# Patient Record
Sex: MALE | Race: OTHER | HISPANIC OR LATINO | Employment: UNEMPLOYED | RURAL
[De-identification: names, ages, dates, MRNs, and addresses within clinical notes are randomized per-mention and may not be internally consistent; named-entity substitution may affect disease eponyms.]

---

## 2020-08-13 ENCOUNTER — HISTORICAL (OUTPATIENT)
Dept: ADMINISTRATIVE | Facility: HOSPITAL | Age: 55
End: 2020-08-13

## 2020-08-13 LAB
ALBUMIN SERPL BCP-MCNC: 2.8 G/DL (ref 3.5–5)
ALBUMIN/GLOB SERPL: 0.6 {RATIO}
ALP SERPL-CCNC: 68 U/L (ref 45–115)
ALT SERPL W P-5'-P-CCNC: 26 U/L (ref 16–61)
ANION GAP SERPL CALCULATED.3IONS-SCNC: 16 MMOL/L (ref 7–16)
AST SERPL W P-5'-P-CCNC: 35 U/L (ref 15–37)
BASOPHILS # BLD AUTO: 0 X10E3/UL (ref 0–0.2)
BASOPHILS NFR BLD AUTO: 0 % (ref 0–1)
BILIRUB SERPL-MCNC: 0.5 MG/DL (ref 0–1.2)
BUN SERPL-MCNC: 17 MG/DL (ref 7–18)
CALCIUM SERPL-MCNC: 8.4 MG/DL (ref 8.5–10.1)
CHLORIDE SERPL-SCNC: 97 MMOL/L (ref 98–107)
CO2 SERPL-SCNC: 24 MMOL/L (ref 21–32)
CREAT SERPL-MCNC: 1.03 MG/DL (ref 0.7–1.3)
EOSINOPHIL # BLD AUTO: 0 X10E3/UL (ref 0–0.5)
EOSINOPHIL NFR BLD AUTO: 0 % (ref 1–4)
ERYTHROCYTE [DISTWIDTH] IN BLOOD BY AUTOMATED COUNT: 13.1 % (ref 11.5–14.5)
GLOBULIN SER-MCNC: 4.8 G/DL (ref 2–4)
GLUCOSE SERPL-MCNC: 371 MG/DL (ref 74–106)
HCT VFR BLD AUTO: 42.1 % (ref 40–54)
HGB BLD-MCNC: 14.2 G/DL (ref 13.5–18)
LYMPHOCYTES # BLD AUTO: 0.71 X10E3/UL (ref 1–4.8)
LYMPHOCYTES NFR BLD AUTO: 19.3 % (ref 27–41)
MCH RBC QN AUTO: 27.7 PG (ref 27–31)
MCHC RBC AUTO-ENTMCNC: 33.7 G/DL (ref 32–36)
MCV RBC AUTO: 82 FL (ref 80–96)
MONOCYTES # BLD AUTO: 0.24 X10E3/UL (ref 0–0.8)
MONOCYTES NFR BLD AUTO: 6.5 % (ref 2–6)
MPC BLD CALC-MCNC: 10.8 FL (ref 9.4–12.4)
NEUTROPHILS # BLD AUTO: 2.73 X10E3/UL (ref 1.8–7.7)
NEUTROPHILS NFR BLD AUTO: 74.2 % (ref 53–65)
PLATELET # BLD AUTO: 169 X10E3/UL (ref 150–450)
POTASSIUM SERPL-SCNC: 3.9 MMOL/L (ref 3.5–5.1)
PROT SERPL-MCNC: 7.6 G/DL (ref 6.4–8.2)
RBC # BLD AUTO: 5.12 X10E6/UL (ref 4.6–6.2)
SARS-COV+SARS-COV-2 AG RESP QL IA.RAPID: POSITIVE
SODIUM SERPL-SCNC: 133 MMOL/L (ref 136–145)
TROPONIN I SERPL-MCNC: <0.017 NG/ML (ref 0–0.06)
WBC # BLD AUTO: 3.68 X10E3/UL (ref 4.5–11)

## 2023-06-28 ENCOUNTER — HOSPITAL ENCOUNTER (EMERGENCY)
Facility: HOSPITAL | Age: 58
Discharge: HOME | End: 2023-06-28

## 2023-06-28 VITALS
HEART RATE: 72 BPM | TEMPERATURE: 98 F | SYSTOLIC BLOOD PRESSURE: 124 MMHG | OXYGEN SATURATION: 100 % | RESPIRATION RATE: 16 BRPM | BODY MASS INDEX: 21.59 KG/M2 | WEIGHT: 159.38 LBS | DIASTOLIC BLOOD PRESSURE: 72 MMHG | HEIGHT: 72 IN

## 2023-06-28 DIAGNOSIS — R30.0 DYSURIA: ICD-10-CM

## 2023-06-28 DIAGNOSIS — N40.0 PROSTATE ENLARGEMENT: ICD-10-CM

## 2023-06-28 DIAGNOSIS — K59.00 CONSTIPATION, UNSPECIFIED CONSTIPATION TYPE: ICD-10-CM

## 2023-06-28 DIAGNOSIS — R10.9 ABDOMINAL PAIN, UNSPECIFIED ABDOMINAL LOCATION: Primary | ICD-10-CM

## 2023-06-28 LAB
ALBUMIN SERPL BCP-MCNC: 3.4 G/DL (ref 3.5–5)
ALBUMIN/GLOB SERPL: 0.9 {RATIO}
ALP SERPL-CCNC: 98 U/L (ref 45–115)
ALT SERPL W P-5'-P-CCNC: 16 U/L (ref 16–61)
AMYLASE SERPL-CCNC: 38 U/L (ref 25–115)
ANION GAP SERPL CALCULATED.3IONS-SCNC: 13 MMOL/L (ref 7–16)
AST SERPL W P-5'-P-CCNC: 13 U/L (ref 15–37)
BASOPHILS NFR BLD AUTO: 0.3 % (ref 0–1)
BILIRUB SERPL-MCNC: 0.5 MG/DL (ref ?–1.2)
BUN SERPL-MCNC: 16 MG/DL (ref 7–18)
BUN/CREAT SERPL: 15 (ref 6–20)
CALCIUM SERPL-MCNC: 8.7 MG/DL (ref 8.5–10.1)
CHLORIDE SERPL-SCNC: 97 MMOL/L (ref 98–107)
CO2 SERPL-SCNC: 27 MMOL/L (ref 21–32)
CREAT SERPL-MCNC: 1.08 MG/DL (ref 0.7–1.3)
EGFR (NO RACE VARIABLE) (RUSH/TITUS): 81 ML/MIN/1.73M2
EOSINOPHIL NFR BLD AUTO: 0.8 % (ref 1–4)
ERYTHROCYTE [DISTWIDTH] IN BLOOD BY AUTOMATED COUNT: 12.4 % (ref 11.5–14.5)
GLOBULIN SER-MCNC: 3.9 G/DL (ref 2–4)
GLUCOSE SERPL-MCNC: 490 MG/DL (ref 74–106)
HCT VFR BLD AUTO: 38.3 % (ref 40–54)
HGB BLD-MCNC: 13.2 G/DL (ref 13.5–18)
IMM GRANULOCYTES NFR BLD: 0.3 % (ref 0–0.4)
LIPASE SERPL-CCNC: 56 U/L (ref 73–393)
LYMPHOCYTES NFR BLD AUTO: 32.1 % (ref 27–41)
MCH RBC QN AUTO: 29.3 PG (ref 27–31)
MCHC RBC AUTO-ENTMCNC: 34.5 G/DL (ref 32–36)
MCV RBC AUTO: 84.9 FL (ref 80–96)
MONOCYTES NFR BLD AUTO: 8 % (ref 2–6)
MPC BLD CALC-MCNC: 9.7 FL (ref 9.4–12.4)
NEUTROPHILS NFR BLD AUTO: 58.8 % (ref 53–65)
PLATELET # BLD AUTO: 294 K/UL (ref 150–400)
POTASSIUM SERPL-SCNC: 4.6 MMOL/L (ref 3.5–5.1)
PROT SERPL-MCNC: 7.3 G/DL (ref 6.4–8.2)
RBC # BLD AUTO: 4.51 M/UL (ref 4.6–6.2)
SODIUM SERPL-SCNC: 132 MMOL/L (ref 136–145)
WBC # BLD AUTO: 7.23 K/UL (ref 4.5–11)

## 2023-06-28 PROCEDURE — 96360 HYDRATION IV INFUSION INIT: CPT

## 2023-06-28 PROCEDURE — 99284 EMERGENCY DEPT VISIT MOD MDM: CPT | Mod: ,,,

## 2023-06-28 PROCEDURE — 83690 ASSAY OF LIPASE: CPT

## 2023-06-28 PROCEDURE — 85025 COMPLETE CBC W/AUTO DIFF WBC: CPT

## 2023-06-28 PROCEDURE — 82150 ASSAY OF AMYLASE: CPT

## 2023-06-28 PROCEDURE — 25500020 PHARM REV CODE 255

## 2023-06-28 PROCEDURE — 99284 PR EMERGENCY DEPT VISIT,LEVEL IV: ICD-10-PCS | Mod: ,,,

## 2023-06-28 PROCEDURE — 25000003 PHARM REV CODE 250

## 2023-06-28 PROCEDURE — 99285 EMERGENCY DEPT VISIT HI MDM: CPT | Mod: 25

## 2023-06-28 PROCEDURE — 84153 ASSAY OF PSA TOTAL: CPT

## 2023-06-28 PROCEDURE — 80053 COMPREHEN METABOLIC PANEL: CPT

## 2023-06-28 RX ORDER — SYRING-NEEDL,DISP,INSUL,0.3 ML 29 G X1/2"
296 SYRINGE, EMPTY DISPOSABLE MISCELLANEOUS
Status: COMPLETED | OUTPATIENT
Start: 2023-06-28 | End: 2023-06-28

## 2023-06-28 RX ORDER — TAMSULOSIN HYDROCHLORIDE 0.4 MG/1
0.4 CAPSULE ORAL DAILY
Qty: 30 CAPSULE | Refills: 0 | Status: SHIPPED | OUTPATIENT
Start: 2023-06-28 | End: 2023-07-28

## 2023-06-28 RX ORDER — CEPHALEXIN 500 MG/1
500 CAPSULE ORAL EVERY 12 HOURS
Qty: 20 CAPSULE | Refills: 0 | Status: SHIPPED | OUTPATIENT
Start: 2023-06-28 | End: 2023-07-08

## 2023-06-28 RX ADMIN — SODIUM CHLORIDE 1000 ML: 9 INJECTION, SOLUTION INTRAVENOUS at 04:06

## 2023-06-28 RX ADMIN — MAGNESIUM CITRATE 296 ML: 1.75 LIQUID ORAL at 04:06

## 2023-06-28 RX ADMIN — IOPAMIDOL 100 ML: 755 INJECTION, SOLUTION INTRAVENOUS at 03:06

## 2023-06-28 NOTE — ED PROVIDER NOTES
Encounter Date: 6/28/2023       History     Chief Complaint   Patient presents with    Abdominal Pain     3 weeks      Patient is a 57-year-old  male who presents emergency department via POV with complaint of right lower quadrant pain x3 weeks.  Patient is extremely tender to touch, denies any previous abdominal surgeries.  No rebound tenderness with palpation upon physical exam.    The history is provided by the patient.   Abdominal Pain  The current episode started several weeks ago. The onset of the illness was gradual. The problem has been gradually worsening. The abdominal pain is located in the RLQ and periumbilical region. The abdominal pain does not radiate. The severity of the abdominal pain is 10/10. The abdominal pain is relieved by nothing. The abdominal pain is exacerbated by deep breathing, movement and coughing. The other symptoms of the illness do not include fever, fatigue, jaundice, melena, nausea, vomiting, diarrhea, dysuria, hematemesis, hematochezia, vaginal discharge or vaginal bleeding.   The illness is associated with alcohol use. The patient has not had a change in bowel habit. Symptoms associated with the illness do not include chills, anorexia, diaphoresis, heartburn, constipation, urgency, hematuria, frequency or back pain. Significant associated medical issues include diabetes. Significant associated medical issues do not include PUD, GERD, inflammatory bowel disease, sickle cell disease, gallstones, liver disease, substance abuse, diverticulitis, HIV or cardiac disease.   Review of patient's allergies indicates:  No Known Allergies  Past Medical History:   Diagnosis Date    Diabetes mellitus      History reviewed. No pertinent surgical history.  History reviewed. No pertinent family history.  Social History     Tobacco Use    Smoking status: Never    Smokeless tobacco: Never   Substance Use Topics    Alcohol use: Never    Drug use: Never     Review of Systems   Constitutional:   Negative for chills, diaphoresis, fatigue and fever.   Eyes: Negative.    Respiratory: Negative.     Cardiovascular: Negative.    Gastrointestinal:  Positive for abdominal pain. Negative for abdominal distention, anal bleeding, anorexia, blood in stool, constipation, diarrhea, heartburn, hematemesis, hematochezia, jaundice, melena, nausea, rectal pain and vomiting.   Endocrine: Negative.    Genitourinary:  Negative for dysuria, frequency, hematuria, urgency, vaginal bleeding and vaginal discharge.   Musculoskeletal:  Negative for back pain.   Skin: Negative.    Allergic/Immunologic: Negative.    Neurological: Negative.    Hematological: Negative.    Psychiatric/Behavioral: Negative.       Physical Exam     Initial Vitals [06/28/23 1410]   BP Pulse Resp Temp SpO2   124/80 90 20 97.9 °F (36.6 °C) 98 %      MAP       --         Physical Exam    Nursing note and vitals reviewed.  Constitutional: Vital signs are normal. He appears well-developed and well-nourished. He is not diaphoretic. He is cooperative.  Non-toxic appearance. He does not have a sickly appearance. He does not appear ill. No distress.   Cardiovascular:  Normal rate, regular rhythm, S1 normal, S2 normal, normal heart sounds, intact distal pulses and normal pulses.     Exam reveals no gallop, no S3, no S4, no distant heart sounds and no friction rub.       No murmur heard.  No systolic murmur is present.  Pulmonary/Chest: Effort normal and breath sounds normal.   Abdominal: Abdomen is soft and flat. Bowel sounds are normal. There is abdominal tenderness in the right lower quadrant and periumbilical area. No hernia.   No right CVA tenderness.  No left CVA tenderness. There is tenderness at McBurney's point. There is no rebound, no guarding and negative Connors's sign. negative obturator sign, negative psoas sign and negative Rovsing's sign    Lymphadenopathy:     He has no cervical adenopathy.     He has no axillary adenopathy.   Neurological: He is alert  and oriented to person, place, and time. He has normal strength and normal reflexes. He displays normal reflexes. No cranial nerve deficit or sensory deficit. He displays a negative Romberg sign. GCS eye subscore is 4. GCS verbal subscore is 5. GCS motor subscore is 6.   Skin: Skin is warm, dry and intact. Capillary refill takes less than 2 seconds. No rash noted.   Psychiatric: He has a normal mood and affect. His speech is normal and behavior is normal. Thought content normal. Cognition and memory are normal.       Medical Screening Exam   See Full Note    ED Course   Procedures  Labs Reviewed   COMPREHENSIVE METABOLIC PANEL - Abnormal; Notable for the following components:       Result Value    Sodium 132 (*)     Chloride 97 (*)     Glucose 490 (*)     Albumin 3.4 (*)     AST 13 (*)     All other components within normal limits   LIPASE - Abnormal; Notable for the following components:    Lipase 56 (*)     All other components within normal limits   CBC WITH DIFFERENTIAL - Abnormal; Notable for the following components:    RBC 4.51 (*)     Hemoglobin 13.2 (*)     Hematocrit 38.3 (*)     Monocytes % 8.0 (*)     Eosinophils % 0.8 (*)     All other components within normal limits   AMYLASE - Normal   CBC W/ AUTO DIFFERENTIAL    Narrative:     The following orders were created for panel order CBC auto differential.  Procedure                               Abnormality         Status                     ---------                               -----------         ------                     CBC with Differential[[id number]]        Abnormal            Final result               Manual Differential[[id number]]                              Final result                 Please view results for these tests on the individual orders.   MANUAL DIFFERENTIAL   PSA, TOTAL (DIAGNOSTIC)          Imaging Results              CT Abdomen Pelvis With Contrast (Final result)  Result time 06/28/23 15:57:53      Final result by Tavon WHEELER  DO Odette (06/28/23 15:57:53)                   Impression:      No evidence of appendicitis. The terminal ileum is normal.  No acute findings within the right lower quadrant.    Moderate colonic stool.    Prostatomegaly, correlate with PSA    Findings suggesting bladder outlet obstruction.    Multilevel degenerative change resulting in severe spinal canal narrowing at multiple levels involving the lumbar spine.      Electronically signed by: Tavon Pino  Date:    06/28/2023  Time:    15:57               Narrative:    EXAMINATION:  CT ABDOMEN PELVIS WITH CONTRAST    CLINICAL HISTORY:  RLQ abdominal pain (Age >= 14y);    COMPARISON:  None.    TECHNIQUE:  CT ABDOMEN PELVIS WITH [id number] cc of Isovue 370    FINDINGS:  Lower lobes: Clear.    Cardiac: No effusion.    Abdomen:    Hepatobiliary/gallbladder: Normal    Spleen: Normal    Pancreas: Normal    Adrenal/Genitourinary system: Simple cyst right kidney.  No hydronephrosis.  Distended urinary bladder.  The    Bowel and Mesentery: There is no evidence for bowel obstruction.  Moderate colonic stool.  The appendix is normal.    Peritoneum: Normal.    Retroperitoneum: No enlarged lymph nodes.    Vasculature: Normal.    Reproductive: Prostatomegaly    Lymph nodes: No enlarged lymph nodes.    Abdominal wall: Normal.    Osseous structures: Multilevel degenerative change resulting in severe spinal canal narrowing at multiple levels involving the lumbar spine.                                    X-Rays:   Independently Interpreted Readings:   Other Readings:  Reading Physician Reading Date Result Priority  Tavon Pino DO  [phone] 6/28/2023 STAT    Narrative & Impression  EXAMINATION:  CT ABDOMEN PELVIS WITH CONTRAST     CLINICAL HISTORY:  RLQ abdominal pain (Age >= 14y);     COMPARISON:  None.     TECHNIQUE:  CT ABDOMEN PELVIS WITH [id number] cc of Isovue 370     FINDINGS:  Lower lobes: Clear.     Cardiac: No effusion.     Abdomen:      Hepatobiliary/gallbladder: Normal     Spleen: Normal     Pancreas: Normal     Adrenal/Genitourinary system: Simple cyst right kidney.  No hydronephrosis.  Distended urinary bladder.  The     Bowel and Mesentery: There is no evidence for bowel obstruction.  Moderate colonic stool.  The appendix is normal.     Peritoneum: Normal.     Retroperitoneum: No enlarged lymph nodes.     Vasculature: Normal.     Reproductive: Prostatomegaly     Lymph nodes: No enlarged lymph nodes.     Abdominal wall: Normal.     Osseous structures: Multilevel degenerative change resulting in severe spinal canal narrowing at multiple levels involving the lumbar spine.     Impression:     No evidence of appendicitis. The terminal ileum is normal.  No acute findings within the right lower quadrant.     Moderate colonic stool.     Prostatomegaly, correlate with PSA     Findings suggesting bladder outlet obstruction.     Multilevel degenerative change resulting in severe spinal canal narrowing at multiple levels involving the lumbar spine.        Electronically signed by: Tavon Pino  Date:                                            06/28/2023  Time:                                           15:57    Medications   sodium chloride 0.9% bolus 1,000 mL 1,000 mL (1,000 mLs Intravenous New Bag 6/28/23 1604)   magnesium citrate solution 296 mL (has no administration in time range)   iopamidoL (ISOVUE-370) injection 100 mL (100 mLs Intravenous Given 6/28/23 8623)     Medical Decision Making:   Initial Assessment:    Patient is a 57-year-old  male who presents emergency department via POV with complaint of right lower quadrant pain x3 weeks.  Patient is extremely tender to touch, denies any previous abdominal surgeries.  No rebound tenderness with palpation upon physical exam.    The history is provided by the patient.   Abdominal Pain  The current episode started several weeks ago. The onset of the illness was gradual. The problem has been  gradually worsening. The abdominal pain is located in the RLQ and periumbilical region. The abdominal pain does not radiate. The severity of the abdominal pain is 10/10. The abdominal pain is relieved by nothing. The abdominal pain is exacerbated by deep breathing, movement and coughing. The other symptoms of the illness do not include fever, fatigue, jaundice, melena, nausea, vomiting, diarrhea, dysuria, hematemesis, hematochezia, vaginal discharge or vaginal bleeding.   The illness is associated with alcohol use. The patient has not had a change in bowel habit. Symptoms associated with the illness do not include chills, anorexia, diaphoresis, heartburn, constipation, urgency, hematuria, frequency or back pain. Significant associated medical issues include diabetes. Significant associated medical issues do not include PUD, GERD, inflammatory bowel disease, sickle cell disease, gallstones, liver disease, substance abuse, diverticulitis, HIV or cardiac disease.     Physical Exam    Nursing note and vitals reviewed.  Constitutional: Vital signs are normal. He appears well-developed and well-nourished. He is not diaphoretic. He is cooperative.  Non-toxic appearance. He does not have a sickly appearance. He does not appear ill. No distress.   Cardiovascular:  Normal rate, regular rhythm, S1 normal, S2 normal, normal heart sounds, intact distal pulses and normal pulses.     Exam reveals no gallop, no S3, no S4, no distant heart sounds and no friction rub.       No murmur heard.  No systolic murmur is present.  Pulmonary/Chest: Effort normal and breath sounds normal.   Abdominal: Abdomen is soft and flat. Bowel sounds are normal. There is abdominal tenderness in the right lower quadrant and periumbilical area. No hernia.   No right CVA tenderness.  No left CVA tenderness. There is tenderness at McBurney's point. There is no rebound, no guarding and negative Connors's sign. negative obturator sign, negative psoas sign and  negative Rovsing's sign    Lymphadenopathy:     He has no cervical adenopathy.     He has no axillary adenopathy.   Neurological: He is alert and oriented to person, place, and time. He has normal strength and normal reflexes. He displays normal reflexes. No cranial nerve deficit or sensory deficit. He displays a negative Romberg sign. GCS eye subscore is 4. GCS verbal subscore is 5. GCS motor subscore is 6.   Skin: Skin is warm, dry and intact. Capillary refill takes less than 2 seconds. No rash noted.   Psychiatric: He has a normal mood and affect. His speech is normal and behavior is normal. Thought content normal. Cognition and memory are normal.     Differential Diagnosis:     Prostatomegaly  Constipation   Dysuria   Abdominal pain unspecified     Clinical Tests:   Lab Tests: Ordered and Reviewed       <> Summary of Lab: Labs Reviewed  COMPREHENSIVE METABOLIC PANEL - Abnormal; Notable for the following components:      Result Value   Sodium 132 (*)    Chloride 97 (*)    Glucose 490 (*)    Albumin 3.4 (*)    AST 13 (*)    All other components within normal limits  LIPASE - Abnormal; Notable for the following components:   Lipase 56 (*)    All other components within normal limits  CBC WITH DIFFERENTIAL - Abnormal; Notable for the following components:   RBC 4.51 (*)    Hemoglobin 13.2 (*)    Hematocrit 38.3 (*)    Monocytes % 8.0 (*)    Eosinophils % 0.8 (*)    All other components within normal limits  AMYLASE - Normal  CBC W/ AUTO DIFFERENTIAL   Narrative:    The following orders were created for panel order CBC auto differential.  Procedure                               Abnormality         Status                     ---------                               -----------         ------                     CBC with Differential[[id number]]        Abnormal            Final result               Manual Differential[[id number]]                              Final result                 Please view results for these tests  on the individual orders.  MANUAL DIFFERENTIAL  PSA, TOTAL (DIAGNOSTIC)    Radiological Study: Ordered and Reviewed  ED Management:  Saline Lock IV   Normal saline bolus x1 L   Magnesium citrate 1 bottle given p.o.  Rx given for Flomax and Keflex to take as directed   Patient to follow up with Donnell Wiseman in the clinic in 2 days for ER follow-up   Patient discharged home           ED Course as of 06/28/23 1621   Wed Jun 28, 2023   1528 Lipase(!): 56 [AC]   1528 Sodium(!): 132 [AC]   1528 Glucose(!): 490 [AC]   1528 Albumin(!): 3.4 [AC]   1528 AST(!): 13 [AC]   1528 Hemoglobin(!): 13.2 [AC]   1528 Hematocrit(!): 38.3 [AC]   1617   Lab results as well as CT scan/report reviewed by me and discussed with patient and family.  Discharge instructions given along with strict return precautions patient and family verbalized understanding. [AC]      ED Course User Index  [AC] LILLIE Gibbons                Clinical Impression:   Final diagnoses:  [R10.9] Abdominal pain, unspecified abdominal location (Primary)  [K59.00] Constipation, unspecified constipation type  [N40.0] Prostate enlargement  [R30.0] Dysuria        ED Disposition Condition    Discharge Stable          ED Prescriptions       Medication Sig Dispense Start Date End Date Auth. Provider    cephALEXin (KEFLEX) 500 MG capsule Take 1 capsule (500 mg total) by mouth every 12 (twelve) hours. for 10 days 20 capsule 6/28/2023 7/8/2023 LILLIE Gibbons    tamsulosin (FLOMAX) 0.4 mg Cap Take 1 capsule (0.4 mg total) by mouth once daily. 30 capsule 6/28/2023 7/28/2023 LILLIE Gibbons          Follow-up Information       Follow up With Specialties Details Why Contact Info    Donnell Wiseman NP Family Medicine In 2 days for ER follow-up [address]  [place] [address] [phone]               LILLIE Gibbons  06/28/23 1621

## 2023-06-28 NOTE — DISCHARGE INSTRUCTIONS
Take medication as directed by the label on the bottle, take magnesium citrate liquid when she gets home, follow up with Donnell Wiseman in 2 days for ER follow-up, return to the emergency room if you experience any chest pain and/or shortness of breath.

## 2023-06-28 NOTE — ED TRIAGE NOTES
Presents to ed per pov c/o RUQ and RLQ pain and nausea x 3 weeks that has gotten worse .Denies vomiting.

## 2023-06-29 LAB — PSA SERPL-MCNC: 1.82 NG/ML

## 2023-12-26 ENCOUNTER — HOSPITAL ENCOUNTER (EMERGENCY)
Facility: HOSPITAL | Age: 58
Discharge: HOME | End: 2023-12-26

## 2023-12-26 VITALS
BODY MASS INDEX: 22.59 KG/M2 | TEMPERATURE: 98 F | SYSTOLIC BLOOD PRESSURE: 138 MMHG | HEIGHT: 72 IN | HEART RATE: 76 BPM | OXYGEN SATURATION: 100 % | RESPIRATION RATE: 18 BRPM | WEIGHT: 166.81 LBS | DIASTOLIC BLOOD PRESSURE: 76 MMHG

## 2023-12-26 DIAGNOSIS — M25.562 ACUTE PAIN OF LEFT KNEE: ICD-10-CM

## 2023-12-26 DIAGNOSIS — R52 PAIN: ICD-10-CM

## 2023-12-26 DIAGNOSIS — M25.561 ACUTE PAIN OF RIGHT KNEE: ICD-10-CM

## 2023-12-26 DIAGNOSIS — S80.212A ABRASION, LEFT KNEE, INITIAL ENCOUNTER: ICD-10-CM
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
## 2023-12-26 PROCEDURE — 25000003 PHARM REV CODE 250

## 2023-12-26 PROCEDURE — 99284 PR EMERGENCY DEPT VISIT,LEVEL IV: ICD-10-PCS | Mod: ,,,

## 2023-12-26 PROCEDURE — 99283 EMERGENCY DEPT VISIT LOW MDM: CPT

## 2023-12-26 PROCEDURE — 99284 EMERGENCY DEPT VISIT MOD MDM: CPT | Mod: ,,,

## 2023-12-26 RX ORDER — IBUPROFEN 800 MG/1
800 TABLET ORAL
Status: COMPLETED | OUTPATIENT
Start: 2023-12-26 | End: 2023-12-26

## 2023-12-26 RX ADMIN — IBUPROFEN 800 MG: 800 TABLET, FILM COATED ORAL at 08:12

## 2023-12-26 NOTE — ED PROVIDER NOTES
Encounter Date: 12/26/2023       History     Chief Complaint   Patient presents with    Fall     Bilateral knee pain     Patient is a 57 y/o  with PMHx of DM Type 2 presents ambulatory via POV to the ED with c/o bilateral knee pain s/p fall at work. Small abrasion noted to left knee, no deformity noted, no decreased range of motion.     The history is provided by the patient.   Fall  The accident occurred just prior to arrival. The fall occurred while walking. He fell from a height of 3 to 5 ft. He landed on [place]. There was no blood loss. The point of impact was the left knee and right knee. The pain is present in the right knee and left knee. The pain is at a severity of 5/10. He was Ambulatory at the scene. There was No entrapment after the fall. There was No drug use involved in the accident. There was No alcohol use involved in the accident. Pertinent negatives include no back pain. He has tried nothing for the symptoms.     Review of patient's allergies indicates:  No Known Allergies  Past Medical History:   Diagnosis Date    Diabetes mellitus      History reviewed. No pertinent surgical history.  History reviewed. No pertinent family history.  Social History     Tobacco Use    Smoking status: Never    Smokeless tobacco: Never   Substance Use Topics    Alcohol use: Never    Drug use: Never     Review of Systems   Constitutional: Negative.    HENT: Negative.     Eyes: Negative.    Respiratory: Negative.     Cardiovascular: Negative.    Gastrointestinal: Negative.    Endocrine: Negative.    Genitourinary: Negative.    Musculoskeletal:  Negative for arthralgias, back pain, gait problem, joint swelling and myalgias.   Skin:  Positive for wound.        Small abrasion noted to left knee   Allergic/Immunologic: Negative.    Neurological: Negative.    Hematological: Negative.        Physical Exam     Initial Vitals [12/26/23 0811]   BP Pulse Resp Temp SpO2   138/76 76 18 97.5 °F (36.4 °C) 100 %      MAP        --         Physical Exam    Nursing note and vitals reviewed.  Constitutional: Vital signs are normal. He appears well-developed and well-nourished. He is not diaphoretic. He is cooperative.  Non-toxic appearance. He does not have a sickly appearance. He does not appear ill. No distress.   Cardiovascular:  Normal rate, regular rhythm, S1 normal, S2 normal, normal heart sounds and normal pulses.     Exam reveals no gallop, no S3, no S4, no distant heart sounds and no friction rub.       No murmur heard.  No systolic murmur is present.  Pulmonary/Chest: Effort normal and breath sounds normal.   Abdominal: Abdomen is soft and flat. Bowel sounds are normal. There is no abdominal tenderness. No hernia.   Musculoskeletal:      Right lower leg: Tenderness present. No swelling, deformity, lacerations or bony tenderness. No edema.      Left lower leg: Tenderness present. No swelling, deformity, lacerations or bony tenderness. No edema.        Legs:      Lymphadenopathy:     He has no cervical adenopathy.     He has no axillary adenopathy.   Neurological: He is alert and oriented to person, place, and time. He has normal strength and normal reflexes. He displays normal reflexes. No cranial nerve deficit or sensory deficit. He displays a negative Romberg sign. GCS eye subscore is 4. GCS verbal subscore is 5. GCS motor subscore is 6.   Skin: Skin is warm, dry and intact. Capillary refill takes less than 2 seconds. No rash noted.   Psychiatric: He has a normal mood and affect. His speech is normal and behavior is normal. Judgment and thought content normal. He is not actively hallucinating. Cognition and memory are normal. He is attentive.         Medical Screening Exam   See Full Note    ED Course   Procedures  Labs Reviewed - No data to display       Imaging Results              X-Ray Knee 1 or 2 View Bilateral (Final result)  Result time 12/26/23 08:41:58   Procedure changed from X-Ray Knee 3 View Left     Final result by  Caleb Skelton II, MD (12/26/23 08:41:58)                   Impression:      No acute injury      Electronically signed by: Caleb Skelton  Date:    12/26/2023  Time:    08:41               Narrative:    EXAMINATION:  XR KNEE 1 OR 2 VIEW BILATERAL    CLINICAL HISTORY:  Knee pain, initial exam;s/p fall; Pain, unspecified    COMPARISON:  None available    TECHNIQUE:  XR KNEE 2 VIEW BILATERAL    FINDINGS:  No evidence of fracture seen.  The alignment of the joints appears normal.  Mild to moderate bilateral tricompartmental degenerative change is present.  No soft tissue abnormality is seen.                                    X-Rays:   Independently Interpreted Readings:   Other Readings:  Caleb Skelton II, MD  [phone] 12/26/2023 STAT    Narrative & Impression  EXAMINATION:  XR KNEE 1 OR 2 VIEW BILATERAL     CLINICAL HISTORY:  Knee pain, initial exam;s/p fall; Pain, unspecified     COMPARISON:  None available     TECHNIQUE:  XR KNEE 2 VIEW BILATERAL     FINDINGS:  No evidence of fracture seen.  The alignment of the joints appears normal.  Mild to moderate bilateral tricompartmental degenerative change is present.  No soft tissue abnormality is seen.     Impression:     No acute injury        Electronically signed by: Caleb Skelton  Date:                                            12/26/2023      Medications   ibuprofen tablet 800 mg (800 mg Oral Given 12/26/23 0817)     Medical Decision Making  Patient is a 57 y/o  with PMHx of DM Type 2 presents ambulatory via POV to the ED with c/o bilateral knee pain s/p fall at work. Small abrasion noted to left knee, no deformity noted, no decreased range of motion.     The history is provided by the patient.   Fall  The accident occurred just prior to arrival. The fall occurred while walking. He fell from a height of 3 to 5 ft. He landed on [place]. There was no blood loss. The point of impact was the left knee and right knee. The pain is present in the  right knee and left knee. The pain is at a severity of 5/10. He was Ambulatory at the scene. There was No entrapment after the fall. There was No drug use involved in the accident. There was No alcohol use involved in the accident. Pertinent negatives include no back pain. He has tried nothing for the symptoms.       Amount and/or Complexity of Data Reviewed  Radiology: ordered. Decision-making details documented in ED Course.    Risk  Prescription drug management.               ED Course as of 12/26/23 0849   Tue Dec 26, 2023   0845 X-Ray Knee 1 or 2 View Bilateral [AC]   0846 Xray images/report reviewed by me and discussed with patient.Discharge instructions given along with strict return precautions, patient verbalizes understanding.   [AC]      ED Course User Index  [AC] Marc Silva FNP                           Clinical Impression:   Final diagnoses:  [R52] Pain  [R52] Pain - s/p fall  [W19.XXXA] Fall, initial encounter (Primary)  [M25.561] Acute pain of right knee  [M25.562] Acute pain of left knee  [S80.212A] Abrasion, left knee, initial encounter        ED Disposition Condition    Discharge Stable          ED Prescriptions    None       Follow-up Information       Follow up With Specialties Details Why Contact Info    local pcp   As needed, If symptoms worsen              Marc Silva FNP  12/26/23 0873

## 2023-12-26 NOTE — ED TRIAGE NOTES
58 year old WNWD male presents to ER with c/o bilateral knee pain d/t fall at work. Pt states he slipped on floor. Small abrasion noted to left knee, no bleeding noted at site. Pt denies any LOC and no other injuries reported.   All VS are WNL. Pt is AA&O x 4.

## 2023-12-26 NOTE — DISCHARGE INSTRUCTIONS
Take Tylenol and Motrin as needed for pain, follow up with local pcp as needed if symptoms continue, return to the ER if symptoms worsen.     The examination and treatment you have received in the Emergency Department today have been rendered on an emergency basis only and are not intended to be a substitute for an effort to provide complete medical care. You should contact your follow-up physician as it is important that you let him or her check you and report any new or remaining problems since it is impossible to recognize and treat all elements of an injury or illness in a single emergency care center visit.